# Patient Record
(demographics unavailable — no encounter records)

---

## 2025-01-06 NOTE — ASSESSMENT
[FreeTextEntry1] : Previous doc: she has adv OA in PF and mild in the med compartment with some spurring - we will start with PT she is loosing wt and did discuss HA inj in the future - some mild Hand OA 8/7/18: Less pain since starting PT - will continue this for now. 8/27/18: Acute worsening right knee pain - asp/inj today and reeval in 1 month. Will get auth for orthovisc. 10/16/18: Inj tolerated well - asp 30cc. 10/23/18: Inj tolerated well. Asp 30cc. 10/30/18: Inj tolerated well - asp 30cc. 11/6/18: Inj tolerated well - asp 30cc. 12/4/18: No sig relief from HA or cortisone - MRI right knee eval for MMT. Has OA but mostly PF compartment and her pain is medial. 1/8/19: MRI showing adv medial and PF OA. Too much damage for arthroscopy. Discussed right TKA when she is ready. - she is planning for TKA in June when the kids are off as she takes care of her grandkids 12/5/22: Advanced OA b/l knees. She is not a arthroscope candidate at this time due to severe OA. Due to acute inflammation on the left knee, recommend CSI/ASP and with chronic OA on the right knee, will start HA injections. Discussed need for TKA in the future but at this point, she would like to hold off. If left knee continues to have pain, I recommend starting HA injections next week as she has had good results in the past. Start PT for early ROM of left knee. 12/12/22: Inj tolerated well. 12/19/22: Inj tolerated well. 1/9/23: Inj tolerated well.  Cont left knee pain, start orthovisc left knee today as well. 3/6/23: Had 1st inj 2 months ago but unable to return since due to death in the family - resume series, inj tolerated well.  Right knee is better after orthovisc. 3/20/23: Inj tolerated well. 4/24/23: Inj tolerated well. 5/6/24: Adv OA b/l knees (R>L). XR showing some progression since 2022. Still has decent motion and function- will hold off on surgery and continue to proceed conservatively. She is well informed and would like to proceed with b/l knee csi and a course of PT. Follow up 6 weeks.   1/6/25: Worsening right knee pain recently, will try cortisone inj again - tolerated well.  Had good relief last time from this.  Left knee tolerable for now, will hold off on inj.

## 2025-01-06 NOTE — PROCEDURE
[FreeTextEntry3] : Large joint injection was performed on the right knee. The indication for this procedure was pain, inflammation, and x-ray evidence of Osteoarthritis on this or prior visit. The site was prepped with betadine, ethyl chloride sprayed topically, and sterile technique used. An injection of Lidocaine 3cc of 1%, Bupivacaine (Marcaine) 5cc of 0.25%, Methylprednisolone (Depomedrol) cc of 80 mg was used. Patient was advised to call if redness, pain, or fever occur, apply ice for 15 minutes out of every hour for the next 12-24 hours as tolerated and patient was advised to rest the joint(s) for days. Patient has tried OTC's including aspirin, Ibuprofen, Aleve, etc. or prescription NSAIDS, and/or exercises at home and/or physical therapy without satisfactory response and patient had decreased mobility in the joint. Ultrasound guidance was indicated for this patient due to better visualize joint space. All ultrasound images have been permanently captured and stored accordingly in our picture.   Aspiration of the knee was performed using an 18-gauge needle under sterile conditions. ------------cc of ---------------fluid was aspirated (this was sent for cell count, culture, gram stain, and crystals)

## 2025-01-06 NOTE — IMAGING
[de-identified] : left knee: no pain with valgus stress 5-100 medial joint line tenderness crepitus 5/5 NVI  right knee: 5-100 medial joint line tenderness  crepitus no sig effusion +2 pt pulses 5/5 NVI

## 2025-01-06 NOTE — HISTORY OF PRESENT ILLNESS
[5] : 5 [2] : 2 [Dull/Aching] : dull/aching [Radiating] : radiating [Rest] : rest [Ice] : ice [Injection therapy] : injection therapy [Stairs] : stairs [de-identified] : 1/6/25: Worsening knee pain recently.  Did well with cortisone last time.  Previous doc: 4/24/18: Right > left knee pain and swelling for several years. Aleve and ice helps. Pain is not everyday. Also left hand pain occasionally - right hand dominant. Family history of RA. 8/7/18: Feeling better with PT. 8/27/18: Worsening pain after last visit, difficulty walking. 10/16/18: Orthovisc #1 right knee. 10/23/18: Orthovisc #2 right knee. 10/30/18: Orthovisc #3 right knee. Improving. 11/6/18: Orthovisc #4 right knee. 12/4/18: Mild relief from HA injections but still has pain. 1/8/19: F/U MRI right knee. Continued pain - had a few days where it was better but overall still trouble sleeping and pain with activity.  3/5 she is overall doing better and has better ROM wth PT  Pain at night some nights not pain min pain during the day finding lately more days with less pain  12/5/22: Patient was moving boxes 11/22/22 and turned and felt an acute pop in her left knee that is significantly painful. She has been wearing a BLANCO since then. Pain has started to subside slowly. Also having increasing pain in the right knee with hx of OA. She was contemplating TKA in the past but with HA injections and PT she has gotten better.  12/12/22: Orthovisc #2 right knee. 12/19/22: Orthovisc #3 right knee. 1/9/23: Orthovisc #4 right knee, improving.  Concerned about left knee. 3/6/23: Mild improvement with right knee pain s/p completing series of orthovisc injections. Started orthovisc for the left knee at last visit, has not been able to come back due to a death in the family. Reports continued pain and stiffness over both knees.  3/20/23: Orthovisc #3 left knee. 4/24/23: Orthovisc #4 left knee.  Has not been able to f/u sooner due to travel for family. 5/6/24: Adv OA b/l knees- here today for the RT knee. Interested in beginning PT. Difficulty with extension. Taking aleeve as needed with good relief. Ambulates without assistance. [] : no [FreeTextEntry1] : RT knee  [FreeTextEntry5] : Follow up on the RT knee. Patient states she has fluid in it again and is interested in getting it aspirated. Patient states Pain has now recently started to turn into a leg cramp, she notices it is when she sits too long. It has been awhile since PT, but she is not opposed to starting again. Patient states great relief with GEL injections in past [FreeTextEntry6] : Swelling  [FreeTextEntry7] : cramp through leg [de-identified] : prolonged activity

## 2025-01-06 NOTE — DISCUSSION/SUMMARY
[de-identified] : The natural progression of Osteoarthritis was explained to the patient.  We discussed the possible treatment options from conservative to operative.  These included NSAIDS, Glucosamine and Chondrotin sulfate, and Physical Therapy as well different types of injections.  We also discussed that at some point they may progress to needed a TKA.  Information and pamphlets were given when appropriate.

## 2025-03-03 NOTE — HISTORY OF PRESENT ILLNESS
[2] : 2 [Dull/Aching] : dull/aching [Radiating] : radiating [Rest] : rest [Ice] : ice [Injection therapy] : injection therapy [Stairs] : stairs [4] : 4 [Shooting] : shooting [Intermittent] : intermittent [Meds] : meds [de-identified] : 3/3/25: Pt with adv b/l knee OA (R>L). Sig relief with csi inj in the RT knee in January for 3 weeks but now pain has returned and worsening. Feels knee locking up. LT knee has been stable. Ambulates without assistance.   Previous doc: 4/24/18: Right > left knee pain and swelling for several years. Aleve and ice helps. Pain is not everyday. Also left hand pain occasionally - right hand dominant. Family history of RA. 8/7/18: Feeling better with PT. 8/27/18: Worsening pain after last visit, difficulty walking. 10/16/18: Orthovisc #1 right knee. 10/23/18: Orthovisc #2 right knee. 10/30/18: Orthovisc #3 right knee. Improving. 11/6/18: Orthovisc #4 right knee. 12/4/18: Mild relief from HA injections but still has pain. 1/8/19: F/U MRI right knee. Continued pain - had a few days where it was better but overall still trouble sleeping and pain with activity.  3/5 she is overall doing better and has better ROM wth PT  Pain at night some nights not pain min pain during the day finding lately more days with less pain  12/5/22: Patient was moving boxes 11/22/22 and turned and felt an acute pop in her left knee that is significantly painful. She has been wearing a BLANCO since then. Pain has started to subside slowly. Also having increasing pain in the right knee with hx of OA. She was contemplating TKA in the past but with HA injections and PT she has gotten better.  12/12/22: Orthovisc #2 right knee. 12/19/22: Orthovisc #3 right knee. 1/9/23: Orthovisc #4 right knee, improving.  Concerned about left knee. 3/6/23: Mild improvement with right knee pain s/p completing series of orthovisc injections. Started orthovisc for the left knee at last visit, has not been able to come back due to a death in the family. Reports continued pain and stiffness over both knees.  3/20/23: Orthovisc #3 left knee. 4/24/23: Orthovisc #4 left knee.  Has not been able to f/u sooner due to travel for family. 5/6/24: Adv OA b/l knees- here today for the RT knee. Interested in beginning PT. Difficulty with extension. Taking aleeve as needed with good relief. Ambulates without assistance. 1/6/25: Worsening knee pain recently.  Did well with cortisone last time. [] : no [FreeTextEntry1] : RT knee  [FreeTextEntry5] : CSI felt amazing but only lasted 3 weeks [FreeTextEntry6] : Locking and Swelling [FreeTextEntry7] : cramp through leg [de-identified] : prolonged activity, lifting leg, getting up

## 2025-03-03 NOTE — IMAGING
[de-identified] : RIGHT KNEE ROM 5-115, some guarding medial joint line tenderness  lateral joint line tenderness crepitus no sig effusion +2 pt pulses 5/5 no edema NVI  LEFT KNEE no pain with valgus stress 5-100 medial joint line tenderness crepitus 5/5 NVI  XR B/L KNEES (3/3/25) showing adv varus OA, sig PF OA on the left- KL 4, no sig progression from May 2024

## 2025-03-03 NOTE — ASSESSMENT
[FreeTextEntry1] : Previous doc: she has adv OA in PF and mild in the med compartment with some spurring - we will start with PT she is loosing wt and did discuss HA inj in the future - some mild Hand OA 8/7/18: Less pain since starting PT - will continue this for now. 8/27/18: Acute worsening right knee pain - asp/inj today and reeval in 1 month. Will get auth for orthovisc. 10/16/18: Inj tolerated well - asp 30cc. 10/23/18: Inj tolerated well. Asp 30cc. 10/30/18: Inj tolerated well - asp 30cc. 11/6/18: Inj tolerated well - asp 30cc. 12/4/18: No sig relief from HA or cortisone - MRI right knee eval for MMT. Has OA but mostly PF compartment and her pain is medial. 1/8/19: MRI showing adv medial and PF OA. Too much damage for arthroscopy. Discussed right TKA when she is ready. - she is planning for TKA in June when the kids are off as she takes care of her grandkids 12/5/22: Advanced OA b/l knees. She is not a arthroscope candidate at this time due to severe OA. Due to acute inflammation on the left knee, recommend CSI/ASP and with chronic OA on the right knee, will start HA injections. Discussed need for TKA in the future but at this point, she would like to hold off. If left knee continues to have pain, I recommend starting HA injections next week as she has had good results in the past. Start PT for early ROM of left knee. 12/12/22: Inj tolerated well. 12/19/22: Inj tolerated well. 1/9/23: Inj tolerated well.  Cont left knee pain, start orthovisc left knee today as well. 3/6/23: Had 1st inj 2 months ago but unable to return since due to death in the family - resume series, inj tolerated well.  Right knee is better after orthovisc. 3/20/23: Inj tolerated well. 4/24/23: Inj tolerated well. 5/6/24: Adv OA b/l knees (R>L). XR showing some progression since 2022. Still has decent motion and function- will hold off on surgery and continue to proceed conservatively. She is well informed and would like to proceed with b/l knee csi and a course of PT. Follow up 6 weeks.  1/6/25: Worsening right knee pain recently, will try cortisone inj again - tolerated well.  Had good relief last time from this.  Left knee tolerable for now, will hold off on inj.  3/3/25: Pt with adv b/l knee OA (R>L symptomatically). Sig relief with RT knee csi at last visit but only for about 3 weeks. XR show no sig progression but RT knee symptoms have gotten significantly worse. Discussed options- will try HA series as this has provided relief for her in the past. Orthovisc #1 RT knee done today- flaquita well. F/up 1 week to cont series electronic

## 2025-03-03 NOTE — PROCEDURE
[Right] : of the right [Pain] : pain [Inflammation] : inflammation [X-ray evidence of Osteoarthritis on this or prior visit] : x-ray evidence of Osteoarthritis on this or prior visit [Repeat series performed] : repeat series performed [Alcohol] : alcohol [Betadine] : betadine [Ethyl Chloride sprayed topically] : ethyl chloride sprayed topically [Sterile technique used] : sterile technique used [___ cc    1%] : Lidocaine ~Vcc of 1%  [Patient was advised to rest the joint(s) for ____ days] : patient was advised to rest the joint(s) for [unfilled] days [All ultrasound images have been permanently captured and stored accordingly in our picture archiving and communication system] : All ultrasound images have been permanently captured and stored accordingly in our picture archiving and communication system [Visualization of the needle and placement of injection was performed without complication] : visualization of the needle and placement of injection was performed without complication [Large Joint Injection] : Large joint injection [Left] : of the left [Knee] : knee [Orthovisc (30mg)] : 30mg of Orthovisc [#1] : series #1 [] : Patient tolerated procedure well [Call if redness, pain or fever occur] : call if redness, pain or fever occur [Apply ice for 15min out of every hour for the next 12-24 hours as tolerated] : apply ice for 15 minutes out of every hour for the next 12-24 hours as tolerated [Previous OTC use and PT nontherapeutic] : patient has tried OTC's including aspirin, Ibuprofen, Aleve, etc or prescription NSAIDS, and/or exercises at home and/or physical therapy without satisfactory response [Patient had decreased mobility in the joint] : patient had decreased mobility in the joint [Risks, benefits, alternatives discussed / Verbal consent obtained] : the risks benefits, and alternatives have been discussed, and verbal consent was obtained

## 2025-03-03 NOTE — DISCUSSION/SUMMARY
[de-identified] : The natural progression of Osteoarthritis was explained to the patient.  We discussed the possible treatment options from conservative to operative.  These included NSAIDS, Glucosamine and Chondrotin sulfate, and Physical Therapy as well different types of injections.  We also discussed that at some point they may progress to needed a TKA.  Information and pamphlets were given when appropriate.  The risks, benefits, contents and alternatives to injection were explained in full to the patient.  Risks outlined include but are not limited to infection, sepsis, bleeding, scarring, skin discoloration, temporary increase in pain, syncopal episode, failure to resolve symptoms, allergic reaction, flare reaction, permanent white skin discoloration, symptom recurrence, and elevation of blood sugar in diabetics.  Patient understood the risks.  All questions were answered.  After discussion of options, patient requested an injection.  Oral informed consent was obtained and sterile prep was done of the injection site.  Sterile technique was used to introduce the mixture.  Patient tolerated the procedure well.  Patient advised to ice the injection site this evening.  Signs and symptoms of infection reviewed and patient advised to call immediately for redness, fevers, and/or chills.  Entered by Ritu Hylton acting as a scribe.

## 2025-03-18 NOTE — DISCUSSION/SUMMARY
[de-identified] : The natural progression of Osteoarthritis was explained to the patient.  We discussed the possible treatment options from conservative to operative.  These included NSAIDS, Glucosamine and Chondrotin sulfate, and Physical Therapy as well different types of injections.  We also discussed that at some point they may progress to needed a TKA.  Information and pamphlets were given when appropriate.  The risks, benefits, contents and alternatives to injection were explained in full to the patient.  Risks outlined include but are not limited to infection, sepsis, bleeding, scarring, skin discoloration, temporary increase in pain, syncopal episode, failure to resolve symptoms, allergic reaction, flare reaction, permanent white skin discoloration, symptom recurrence, and elevation of blood sugar in diabetics.  Patient understood the risks.  All questions were answered.  After discussion of options, patient requested an injection.  Oral informed consent was obtained and sterile prep was done of the injection site.  Sterile technique was used to introduce the mixture.  Patient tolerated the procedure well.  Patient advised to ice the injection site this evening.  Signs and symptoms of infection reviewed and patient advised to call immediately for redness, fevers, and/or chills.  Progress Note completed by Josie Velasco PA-C *Dr. Richmond- The DANNY assigned on this date is under my supervision and saw this patient independently on this visit. I was in the office suite at the time.  I have periodically reviewed the patient chart as needed and I continue to oversee the medical decision making and care.

## 2025-03-18 NOTE — PROCEDURE
[Right] : of the right [Pain] : pain [Inflammation] : inflammation [X-ray evidence of Osteoarthritis on this or prior visit] : x-ray evidence of Osteoarthritis on this or prior visit [Repeat series performed] : repeat series performed [Alcohol] : alcohol [Betadine] : betadine [Ethyl Chloride sprayed topically] : ethyl chloride sprayed topically [Sterile technique used] : sterile technique used [___ cc    1%] : Lidocaine ~Vcc of 1%  [Patient was advised to rest the joint(s) for ____ days] : patient was advised to rest the joint(s) for [unfilled] days [All ultrasound images have been permanently captured and stored accordingly in our picture archiving and communication system] : All ultrasound images have been permanently captured and stored accordingly in our picture archiving and communication system [Visualization of the needle and placement of injection was performed without complication] : visualization of the needle and placement of injection was performed without complication [Large Joint Injection] : Large joint injection [Left] : of the left [Knee] : knee [Orthovisc (30mg)] : 30mg of Orthovisc [#1] : series #1 [] : Patient tolerated procedure well [Call if redness, pain or fever occur] : call if redness, pain or fever occur [Apply ice for 15min out of every hour for the next 12-24 hours as tolerated] : apply ice for 15 minutes out of every hour for the next 12-24 hours as tolerated [Previous OTC use and PT nontherapeutic] : patient has tried OTC's including aspirin, Ibuprofen, Aleve, etc or prescription NSAIDS, and/or exercises at home and/or physical therapy without satisfactory response [Patient had decreased mobility in the joint] : patient had decreased mobility in the joint [Risks, benefits, alternatives discussed / Verbal consent obtained] : the risks benefits, and alternatives have been discussed, and verbal consent was obtained [#2] : series #2

## 2025-03-18 NOTE — IMAGING
[de-identified] : RIGHT KNEE ROM 5-115, some guarding medial joint line tenderness  lateral joint line tenderness crepitus no sig effusion +2 pt pulses 5/5 no edema NVI  LEFT KNEE no pain with valgus stress 5-100 medial joint line tenderness crepitus 5/5 NVI  XR B/L KNEES (3/3/25) showing adv varus OA, sig PF OA on the left- KL 4, no sig progression from May 2024

## 2025-03-18 NOTE — IMAGING
[de-identified] : RIGHT KNEE ROM 5-115, some guarding medial joint line tenderness  lateral joint line tenderness crepitus no sig effusion +2 pt pulses 5/5 no edema NVI  LEFT KNEE no pain with valgus stress 5-100 medial joint line tenderness crepitus 5/5 NVI  XR B/L KNEES (3/3/25) showing adv varus OA, sig PF OA on the left- KL 4, no sig progression from May 2024

## 2025-03-18 NOTE — HISTORY OF PRESENT ILLNESS
[de-identified] : 3/18/25: Orthovisc #2 right knee.  Previous doc: 4/24/18: Right > left knee pain and swelling for several years. Aleve and ice helps. Pain is not everyday. Also left hand pain occasionally - right hand dominant. Family history of RA. 8/7/18: Feeling better with PT. 8/27/18: Worsening pain after last visit, difficulty walking. 10/16/18: Orthovisc #1 right knee. 10/23/18: Orthovisc #2 right knee. 10/30/18: Orthovisc #3 right knee. Improving. 11/6/18: Orthovisc #4 right knee. 12/4/18: Mild relief from HA injections but still has pain. 1/8/19: F/U MRI right knee. Continued pain - had a few days where it was better but overall still trouble sleeping and pain with activity.  3/5 she is overall doing better and has better ROM wth PT  Pain at night some nights not pain min pain during the day finding lately more days with less pain  12/5/22: Patient was moving boxes 11/22/22 and turned and felt an acute pop in her left knee that is significantly painful. She has been wearing a BLANCO since then. Pain has started to subside slowly. Also having increasing pain in the right knee with hx of OA. She was contemplating TKA in the past but with HA injections and PT she has gotten better.  12/12/22: Orthovisc #2 right knee. 12/19/22: Orthovisc #3 right knee. 1/9/23: Orthovisc #4 right knee, improving.  Concerned about left knee. 3/6/23: Mild improvement with right knee pain s/p completing series of orthovisc injections. Started orthovisc for the left knee at last visit, has not been able to come back due to a death in the family. Reports continued pain and stiffness over both knees.  3/20/23: Orthovisc #3 left knee. 4/24/23: Orthovisc #4 left knee.  Has not been able to f/u sooner due to travel for family. 5/6/24: Adv OA b/l knees- here today for the RT knee. Interested in beginning PT. Difficulty with extension. Taking aleeve as needed with good relief. Ambulates without assistance. 1/6/25: Worsening knee pain recently.  Did well with cortisone last time. 3/3/25: Pt with adv b/l knee OA (R>L). Sig relief with csi inj in the RT knee in January for 3 weeks but now pain has returned and worsening. Feels knee locking up. LT knee has been stable. Ambulates without assistance.

## 2025-03-18 NOTE — HISTORY OF PRESENT ILLNESS
[de-identified] : 3/18/25: Orthovisc #2 right knee.  Previous doc: 4/24/18: Right > left knee pain and swelling for several years. Aleve and ice helps. Pain is not everyday. Also left hand pain occasionally - right hand dominant. Family history of RA. 8/7/18: Feeling better with PT. 8/27/18: Worsening pain after last visit, difficulty walking. 10/16/18: Orthovisc #1 right knee. 10/23/18: Orthovisc #2 right knee. 10/30/18: Orthovisc #3 right knee. Improving. 11/6/18: Orthovisc #4 right knee. 12/4/18: Mild relief from HA injections but still has pain. 1/8/19: F/U MRI right knee. Continued pain - had a few days where it was better but overall still trouble sleeping and pain with activity.  3/5 she is overall doing better and has better ROM wth PT  Pain at night some nights not pain min pain during the day finding lately more days with less pain  12/5/22: Patient was moving boxes 11/22/22 and turned and felt an acute pop in her left knee that is significantly painful. She has been wearing a BLANCO since then. Pain has started to subside slowly. Also having increasing pain in the right knee with hx of OA. She was contemplating TKA in the past but with HA injections and PT she has gotten better.  12/12/22: Orthovisc #2 right knee. 12/19/22: Orthovisc #3 right knee. 1/9/23: Orthovisc #4 right knee, improving.  Concerned about left knee. 3/6/23: Mild improvement with right knee pain s/p completing series of orthovisc injections. Started orthovisc for the left knee at last visit, has not been able to come back due to a death in the family. Reports continued pain and stiffness over both knees.  3/20/23: Orthovisc #3 left knee. 4/24/23: Orthovisc #4 left knee.  Has not been able to f/u sooner due to travel for family. 5/6/24: Adv OA b/l knees- here today for the RT knee. Interested in beginning PT. Difficulty with extension. Taking aleeve as needed with good relief. Ambulates without assistance. 1/6/25: Worsening knee pain recently.  Did well with cortisone last time. 3/3/25: Pt with adv b/l knee OA (R>L). Sig relief with csi inj in the RT knee in January for 3 weeks but now pain has returned and worsening. Feels knee locking up. LT knee has been stable. Ambulates without assistance.

## 2025-03-18 NOTE — ASSESSMENT
[FreeTextEntry1] : Previous doc: she has adv OA in PF and mild in the med compartment with some spurring - we will start with PT she is loosing wt and did discuss HA inj in the future - some mild Hand OA 8/7/18: Less pain since starting PT - will continue this for now. 8/27/18: Acute worsening right knee pain - asp/inj today and reeval in 1 month. Will get auth for orthovisc. 10/16/18: Inj tolerated well - asp 30cc. 10/23/18: Inj tolerated well. Asp 30cc. 10/30/18: Inj tolerated well - asp 30cc. 11/6/18: Inj tolerated well - asp 30cc. 12/4/18: No sig relief from HA or cortisone - MRI right knee eval for MMT. Has OA but mostly PF compartment and her pain is medial. 1/8/19: MRI showing adv medial and PF OA. Too much damage for arthroscopy. Discussed right TKA when she is ready. - she is planning for TKA in June when the kids are off as she takes care of her grandkids 12/5/22: Advanced OA b/l knees. She is not a arthroscope candidate at this time due to severe OA. Due to acute inflammation on the left knee, recommend CSI/ASP and with chronic OA on the right knee, will start HA injections. Discussed need for TKA in the future but at this point, she would like to hold off. If left knee continues to have pain, I recommend starting HA injections next week as she has had good results in the past. Start PT for early ROM of left knee. 12/12/22: Inj tolerated well. 12/19/22: Inj tolerated well. 1/9/23: Inj tolerated well.  Cont left knee pain, start orthovisc left knee today as well. 3/6/23: Had 1st inj 2 months ago but unable to return since due to death in the family - resume series, inj tolerated well.  Right knee is better after orthovisc. 3/20/23: Inj tolerated well. 4/24/23: Inj tolerated well. 5/6/24: Adv OA b/l knees (R>L). XR showing some progression since 2022. Still has decent motion and function- will hold off on surgery and continue to proceed conservatively. She is well informed and would like to proceed with b/l knee csi and a course of PT. Follow up 6 weeks.  1/6/25: Worsening right knee pain recently, will try cortisone inj again - tolerated well.  Had good relief last time from this.  Left knee tolerable for now, will hold off on inj. 3/3/25: Pt with adv b/l knee OA (R>L symptomatically). Sig relief with RT knee csi at last visit but only for about 3 weeks. XR show no sig progression but RT knee symptoms have gotten significantly worse. Discussed options- will try HA series as this has provided relief for her in the past. Orthovisc #1 RT knee done today- flaquita well. F/up 1 week to cont series  3/18/25:  Orthovisc #2 RT knee done today- flaquita well. F/up 1 week to cont series

## 2025-03-18 NOTE — DISCUSSION/SUMMARY
[de-identified] : The natural progression of Osteoarthritis was explained to the patient.  We discussed the possible treatment options from conservative to operative.  These included NSAIDS, Glucosamine and Chondrotin sulfate, and Physical Therapy as well different types of injections.  We also discussed that at some point they may progress to needed a TKA.  Information and pamphlets were given when appropriate.  The risks, benefits, contents and alternatives to injection were explained in full to the patient.  Risks outlined include but are not limited to infection, sepsis, bleeding, scarring, skin discoloration, temporary increase in pain, syncopal episode, failure to resolve symptoms, allergic reaction, flare reaction, permanent white skin discoloration, symptom recurrence, and elevation of blood sugar in diabetics.  Patient understood the risks.  All questions were answered.  After discussion of options, patient requested an injection.  Oral informed consent was obtained and sterile prep was done of the injection site.  Sterile technique was used to introduce the mixture.  Patient tolerated the procedure well.  Patient advised to ice the injection site this evening.  Signs and symptoms of infection reviewed and patient advised to call immediately for redness, fevers, and/or chills.  Progress Note completed by Josie Velasco PA-C *Dr. Richmond- The DANNY assigned on this date is under my supervision and saw this patient independently on this visit. I was in the office suite at the time.  I have periodically reviewed the patient chart as needed and I continue to oversee the medical decision making and care.

## 2025-03-24 NOTE — PROCEDURE
[Right] : of the right [Pain] : pain [Inflammation] : inflammation [X-ray evidence of Osteoarthritis on this or prior visit] : x-ray evidence of Osteoarthritis on this or prior visit [Repeat series performed] : repeat series performed [Alcohol] : alcohol [Betadine] : betadine [Ethyl Chloride sprayed topically] : ethyl chloride sprayed topically [Sterile technique used] : sterile technique used [___ cc    1%] : Lidocaine ~Vcc of 1%  [Patient was advised to rest the joint(s) for ____ days] : patient was advised to rest the joint(s) for [unfilled] days [All ultrasound images have been permanently captured and stored accordingly in our picture archiving and communication system] : All ultrasound images have been permanently captured and stored accordingly in our picture archiving and communication system [Visualization of the needle and placement of injection was performed without complication] : visualization of the needle and placement of injection was performed without complication [Large Joint Injection] : Large joint injection [Left] : of the left [Knee] : knee [Orthovisc (30mg)] : 30mg of Orthovisc [#1] : series #1 [] : Patient tolerated procedure well [Call if redness, pain or fever occur] : call if redness, pain or fever occur [Apply ice for 15min out of every hour for the next 12-24 hours as tolerated] : apply ice for 15 minutes out of every hour for the next 12-24 hours as tolerated [Previous OTC use and PT nontherapeutic] : patient has tried OTC's including aspirin, Ibuprofen, Aleve, etc or prescription NSAIDS, and/or exercises at home and/or physical therapy without satisfactory response [Patient had decreased mobility in the joint] : patient had decreased mobility in the joint [Risks, benefits, alternatives discussed / Verbal consent obtained] : the risks benefits, and alternatives have been discussed, and verbal consent was obtained [#3] : series #3

## 2025-03-28 NOTE — HISTORY OF PRESENT ILLNESS
[de-identified] : 3/24/25: Orthovisc #3 right knee.   Previous doc: 4/24/18: Right > left knee pain and swelling for several years. Aleve and ice helps. Pain is not everyday. Also left hand pain occasionally - right hand dominant. Family history of RA. 8/7/18: Feeling better with PT. 8/27/18: Worsening pain after last visit, difficulty walking. 10/16/18: Orthovisc #1 right knee. 10/23/18: Orthovisc #2 right knee. 10/30/18: Orthovisc #3 right knee. Improving. 11/6/18: Orthovisc #4 right knee. 12/4/18: Mild relief from HA injections but still has pain. 1/8/19: F/U MRI right knee. Continued pain - had a few days where it was better but overall still trouble sleeping and pain with activity. 3/5 she is overall doing better and has better ROM wth PT  Pain at night some nights not pain min pain during the day finding lately more days with less pain  12/5/22: Patient was moving boxes 11/22/22 and turned and felt an acute pop in her left knee that is significantly painful. She has been wearing a BLANCO since then. Pain has started to subside slowly. Also having increasing pain in the right knee with hx of OA. She was contemplating TKA in the past but with HA injections and PT she has gotten better.  12/12/22: Orthovisc #2 right knee. 12/19/22: Orthovisc #3 right knee. 1/9/23: Orthovisc #4 right knee, improving.  Concerned about left knee. 3/6/23: Mild improvement with right knee pain s/p completing series of orthovisc injections. Started orthovisc for the left knee at last visit, has not been able to come back due to a death in the family. Reports continued pain and stiffness over both knees.  3/20/23: Orthovisc #3 left knee. 4/24/23: Orthovisc #4 left knee.  Has not been able to f/u sooner due to travel for family. 5/6/24: Adv OA b/l knees- here today for the RT knee. Interested in beginning PT. Difficulty with extension. Taking aleeve as needed with good relief. Ambulates without assistance. 1/6/25: Worsening knee pain recently.  Did well with cortisone last time. 3/3/25: Pt with adv b/l knee OA (R>L). Sig relief with csi inj in the RT knee in January for 3 weeks but now pain has returned and worsening. Feels knee locking up. LT knee has been stable. Ambulates without assistance.  3/18/25: Orthovisc #2 right knee.

## 2025-03-28 NOTE — IMAGING
[de-identified] : RIGHT KNEE ROM 5-115, some guarding medial joint line tenderness  lateral joint line tenderness crepitus no sig effusion +2 pt pulses 5/5 no edema NVI  LEFT KNEE no pain with valgus stress 5-100 medial joint line tenderness crepitus 5/5 NVI  XR B/L KNEES (3/3/25) showing adv varus OA, sig PF OA on the left- KL 4, no sig progression from May 2024

## 2025-03-28 NOTE — DISCUSSION/SUMMARY
[de-identified] : The natural progression of Osteoarthritis was explained to the patient.  We discussed the possible treatment options from conservative to operative.  These included NSAIDS, Glucosamine and Chondrotin sulfate, and Physical Therapy as well different types of injections.  We also discussed that at some point they may progress to needed a TKA.  Information and pamphlets were given when appropriate.  The risks, benefits, contents and alternatives to injection were explained in full to the patient.  Risks outlined include but are not limited to infection, sepsis, bleeding, scarring, skin discoloration, temporary increase in pain, syncopal episode, failure to resolve symptoms, allergic reaction, flare reaction, permanent white skin discoloration, symptom recurrence, and elevation of blood sugar in diabetics.  Patient understood the risks.  All questions were answered.  After discussion of options, patient requested an injection.  Oral informed consent was obtained and sterile prep was done of the injection site.  Sterile technique was used to introduce the mixture.  Patient tolerated the procedure well.  Patient advised to ice the injection site this evening.  Signs and symptoms of infection reviewed and patient advised to call immediately for redness, fevers, and/or chills.  Entered by Ritu Hylton acting as a scribe.

## 2025-03-28 NOTE — IMAGING
[de-identified] : RIGHT KNEE ROM 5-115, some guarding medial joint line tenderness  lateral joint line tenderness crepitus no sig effusion +2 pt pulses 5/5 no edema NVI  LEFT KNEE no pain with valgus stress 5-100 medial joint line tenderness crepitus 5/5 NVI  XR B/L KNEES (3/3/25) showing adv varus OA, sig PF OA on the left- KL 4, no sig progression from May 2024

## 2025-03-28 NOTE — ASSESSMENT
[FreeTextEntry1] : Previous doc: she has adv OA in PF and mild in the med compartment with some spurring - we will start with PT she is loosing wt and did discuss HA inj in the future - some mild Hand OA 8/7/18: Less pain since starting PT - will continue this for now. 8/27/18: Acute worsening right knee pain - asp/inj today and reeval in 1 month. Will get auth for orthovisc. 10/16/18: Inj tolerated well - asp 30cc. 10/23/18: Inj tolerated well. Asp 30cc. 10/30/18: Inj tolerated well - asp 30cc. 11/6/18: Inj tolerated well - asp 30cc. 12/4/18: No sig relief from HA or cortisone - MRI right knee eval for MMT. Has OA but mostly PF compartment and her pain is medial. 1/8/19: MRI showing adv medial and PF OA. Too much damage for arthroscopy. Discussed right TKA when she is ready. - she is planning for TKA in June when the kids are off as she takes care of her grandkids 12/5/22: Advanced OA b/l knees. She is not a arthroscope candidate at this time due to severe OA. Due to acute inflammation on the left knee, recommend CSI/ASP and with chronic OA on the right knee, will start HA injections. Discussed need for TKA in the future but at this point, she would like to hold off. If left knee continues to have pain, I recommend starting HA injections next week as she has had good results in the past. Start PT for early ROM of left knee. 12/12/22: Inj tolerated well. 12/19/22: Inj tolerated well. 1/9/23: Inj tolerated well.  Cont left knee pain, start orthovisc left knee today as well. 3/6/23: Had 1st inj 2 months ago but unable to return since due to death in the family - resume series, inj tolerated well.  Right knee is better after orthovisc. 3/20/23: Inj tolerated well. 4/24/23: Inj tolerated well. 5/6/24: Adv OA b/l knees (R>L). XR showing some progression since 2022. Still has decent motion and function- will hold off on surgery and continue to proceed conservatively. She is well informed and would like to proceed with b/l knee csi and a course of PT. Follow up 6 weeks.  1/6/25: Worsening right knee pain recently, will try cortisone inj again - tolerated well.  Had good relief last time from this.  Left knee tolerable for now, will hold off on inj. 3/3/25: Pt with adv b/l knee OA (R>L symptomatically). Sig relief with RT knee csi at last visit but only for about 3 weeks. XR show no sig progression but RT knee symptoms have gotten significantly worse. Discussed options- will try HA series as this has provided relief for her in the past. Orthovisc #1 RT knee done today- flaquita well. F/up 1 week to cont series 3/18/25:  Orthovisc #2 RT knee done today- flaquita well. F/up 1 week to cont series  3/24/25: Orthovisc #3 RT knee done today- flaquita well. F/up 1 week to cont series

## 2025-03-28 NOTE — HISTORY OF PRESENT ILLNESS
[de-identified] : 3/24/25: Orthovisc #3 right knee.   Previous doc: 4/24/18: Right > left knee pain and swelling for several years. Aleve and ice helps. Pain is not everyday. Also left hand pain occasionally - right hand dominant. Family history of RA. 8/7/18: Feeling better with PT. 8/27/18: Worsening pain after last visit, difficulty walking. 10/16/18: Orthovisc #1 right knee. 10/23/18: Orthovisc #2 right knee. 10/30/18: Orthovisc #3 right knee. Improving. 11/6/18: Orthovisc #4 right knee. 12/4/18: Mild relief from HA injections but still has pain. 1/8/19: F/U MRI right knee. Continued pain - had a few days where it was better but overall still trouble sleeping and pain with activity. 3/5 she is overall doing better and has better ROM wth PT  Pain at night some nights not pain min pain during the day finding lately more days with less pain  12/5/22: Patient was moving boxes 11/22/22 and turned and felt an acute pop in her left knee that is significantly painful. She has been wearing a BLANCO since then. Pain has started to subside slowly. Also having increasing pain in the right knee with hx of OA. She was contemplating TKA in the past but with HA injections and PT she has gotten better.  12/12/22: Orthovisc #2 right knee. 12/19/22: Orthovisc #3 right knee. 1/9/23: Orthovisc #4 right knee, improving.  Concerned about left knee. 3/6/23: Mild improvement with right knee pain s/p completing series of orthovisc injections. Started orthovisc for the left knee at last visit, has not been able to come back due to a death in the family. Reports continued pain and stiffness over both knees.  3/20/23: Orthovisc #3 left knee. 4/24/23: Orthovisc #4 left knee.  Has not been able to f/u sooner due to travel for family. 5/6/24: Adv OA b/l knees- here today for the RT knee. Interested in beginning PT. Difficulty with extension. Taking aleeve as needed with good relief. Ambulates without assistance. 1/6/25: Worsening knee pain recently.  Did well with cortisone last time. 3/3/25: Pt with adv b/l knee OA (R>L). Sig relief with csi inj in the RT knee in January for 3 weeks but now pain has returned and worsening. Feels knee locking up. LT knee has been stable. Ambulates without assistance.  3/18/25: Orthovisc #2 right knee.

## 2025-03-31 NOTE — PROCEDURE
[Large Joint Injection] : Large joint injection [Right] : of the right [Knee] : knee [Pain] : pain [Inflammation] : inflammation [X-ray evidence of Osteoarthritis on this or prior visit] : x-ray evidence of Osteoarthritis on this or prior visit [Repeat series performed] : repeat series performed [Alcohol] : alcohol [Betadine] : betadine [Ethyl Chloride sprayed topically] : ethyl chloride sprayed topically [Sterile technique used] : sterile technique used [___ cc    1%] : Lidocaine ~Vcc of 1%  [Orthovisc (30mg)] : 30mg of Orthovisc [] : Patient tolerated procedure well [Call if redness, pain or fever occur] : call if redness, pain or fever occur [Apply ice for 15min out of every hour for the next 12-24 hours as tolerated] : apply ice for 15 minutes out of every hour for the next 12-24 hours as tolerated [Patient was advised to rest the joint(s) for ____ days] : patient was advised to rest the joint(s) for [unfilled] days [Previous OTC use and PT nontherapeutic] : patient has tried OTC's including aspirin, Ibuprofen, Aleve, etc or prescription NSAIDS, and/or exercises at home and/or physical therapy without satisfactory response [Patient had decreased mobility in the joint] : patient had decreased mobility in the joint [Risks, benefits, alternatives discussed / Verbal consent obtained] : the risks benefits, and alternatives have been discussed, and verbal consent was obtained [All ultrasound images have been permanently captured and stored accordingly in our picture archiving and communication system] : All ultrasound images have been permanently captured and stored accordingly in our picture archiving and communication system [Visualization of the needle and placement of injection was performed without complication] : visualization of the needle and placement of injection was performed without complication [#4] : series #4

## 2025-03-31 NOTE — IMAGING
[de-identified] : RIGHT KNEE ROM 5-115, some guarding medial joint line tenderness  lateral joint line tenderness crepitus no sig effusion +2 pt pulses 5/5 no edema NVI  LEFT KNEE no pain with valgus stress 5-100 medial joint line tenderness crepitus 5/5 NVI  XR B/L KNEES (3/3/25) showing adv varus OA, sig PF OA on the left- KL 4, no sig progression from May 2024

## 2025-03-31 NOTE — HISTORY OF PRESENT ILLNESS
[4] : 4 [Orthovisc] : Orthovisc [de-identified] : 3/31/25: Orthovisc #4 right knee.  Thinking about surgery.  Previous doc: 4/24/18: Right > left knee pain and swelling for several years. Aleve and ice helps. Pain is not everyday. Also left hand pain occasionally - right hand dominant. Family history of RA. 8/7/18: Feeling better with PT. 8/27/18: Worsening pain after last visit, difficulty walking. 10/16/18: Orthovisc #1 right knee. 10/23/18: Orthovisc #2 right knee. 10/30/18: Orthovisc #3 right knee. Improving. 11/6/18: Orthovisc #4 right knee. 12/4/18: Mild relief from HA injections but still has pain. 1/8/19: F/U MRI right knee. Continued pain - had a few days where it was better but overall still trouble sleeping and pain with activity. 3/5 she is overall doing better and has better ROM wth PT  Pain at night some nights not pain min pain during the day finding lately more days with less pain  12/5/22: Patient was moving boxes 11/22/22 and turned and felt an acute pop in her left knee that is significantly painful. She has been wearing a BLANCO since then. Pain has started to subside slowly. Also having increasing pain in the right knee with hx of OA. She was contemplating TKA in the past but with HA injections and PT she has gotten better.  12/12/22: Orthovisc #2 right knee. 12/19/22: Orthovisc #3 right knee. 1/9/23: Orthovisc #4 right knee, improving.  Concerned about left knee. 3/6/23: Mild improvement with right knee pain s/p completing series of orthovisc injections. Started orthovisc for the left knee at last visit, has not been able to come back due to a death in the family. Reports continued pain and stiffness over both knees.  3/20/23: Orthovisc #3 left knee. 4/24/23: Orthovisc #4 left knee.  Has not been able to f/u sooner due to travel for family. 5/6/24: Adv OA b/l knees- here today for the RT knee. Interested in beginning PT. Difficulty with extension. Taking aleeve as needed with good relief. Ambulates without assistance. 1/6/25: Worsening knee pain recently.  Did well with cortisone last time. 3/3/25: Pt with adv b/l knee OA (R>L). Sig relief with csi inj in the RT knee in January for 3 weeks but now pain has returned and worsening. Feels knee locking up. LT knee has been stable. Ambulates without assistance.  3/18/25: Orthovisc #2 right knee. 3/24/25: Orthovisc #3 right knee.

## 2025-03-31 NOTE — DISCUSSION/SUMMARY
[de-identified] : The natural progression of Osteoarthritis was explained to the patient.  We discussed the possible treatment options from conservative to operative.  These included NSAIDS, Glucosamine and Chondrotin sulfate, and Physical Therapy as well different types of injections.  We also discussed that at some point they may progress to needed a TKA.  Information and pamphlets were given when appropriate.  The risks, benefits, contents and alternatives to injection were explained in full to the patient.  Risks outlined include but are not limited to infection, sepsis, bleeding, scarring, skin discoloration, temporary increase in pain, syncopal episode, failure to resolve symptoms, allergic reaction, flare reaction, permanent white skin discoloration, symptom recurrence, and elevation of blood sugar in diabetics.  Patient understood the risks.  All questions were answered.  After discussion of options, patient requested an injection.  Oral informed consent was obtained and sterile prep was done of the injection site.  Sterile technique was used to introduce the mixture.  Patient tolerated the procedure well.  Patient advised to ice the injection site this evening.  Signs and symptoms of infection reviewed and patient advised to call immediately for redness, fevers, and/or chills.  Entered by Ritu Hylton acting as a scribe.

## 2025-03-31 NOTE — ASSESSMENT
[FreeTextEntry1] : Previous doc: she has adv OA in PF and mild in the med compartment with some spurring - we will start with PT she is loosing wt and did discuss HA inj in the future - some mild Hand OA 8/7/18: Less pain since starting PT - will continue this for now. 8/27/18: Acute worsening right knee pain - asp/inj today and reeval in 1 month. Will get auth for orthovisc. 10/16/18: Inj tolerated well - asp 30cc. 10/23/18: Inj tolerated well. Asp 30cc. 10/30/18: Inj tolerated well - asp 30cc. 11/6/18: Inj tolerated well - asp 30cc. 12/4/18: No sig relief from HA or cortisone - MRI right knee eval for MMT. Has OA but mostly PF compartment and her pain is medial. 1/8/19: MRI showing adv medial and PF OA. Too much damage for arthroscopy. Discussed right TKA when she is ready. - she is planning for TKA in June when the kids are off as she takes care of her grandkids 12/5/22: Advanced OA b/l knees. She is not a arthroscope candidate at this time due to severe OA. Due to acute inflammation on the left knee, recommend CSI/ASP and with chronic OA on the right knee, will start HA injections. Discussed need for TKA in the future but at this point, she would like to hold off. If left knee continues to have pain, I recommend starting HA injections next week as she has had good results in the past. Start PT for early ROM of left knee. 12/12/22: Inj tolerated well. 12/19/22: Inj tolerated well. 1/9/23: Inj tolerated well.  Cont left knee pain, start orthovisc left knee today as well. 3/6/23: Had 1st inj 2 months ago but unable to return since due to death in the family - resume series, inj tolerated well.  Right knee is better after orthovisc. 3/20/23: Inj tolerated well. 4/24/23: Inj tolerated well. 5/6/24: Adv OA b/l knees (R>L). XR showing some progression since 2022. Still has decent motion and function- will hold off on surgery and continue to proceed conservatively. She is well informed and would like to proceed with b/l knee csi and a course of PT. Follow up 6 weeks.  1/6/25: Worsening right knee pain recently, will try cortisone inj again - tolerated well.  Had good relief last time from this.  Left knee tolerable for now, will hold off on inj. 3/3/25: Pt with adv b/l knee OA (R>L symptomatically). Sig relief with RT knee csi at last visit but only for about 3 weeks. XR show no sig progression but RT knee symptoms have gotten significantly worse. Discussed options- will try HA series as this has provided relief for her in the past. Orthovisc #1 RT knee done today- flaquita well. F/up 1 week to cont series 3/18/25:  Orthovisc #2 RT knee done today- flaquita well. F/up 1 week to cont series 3/24/25: Orthovisc #3 RT knee done today- flaquita well. F/up 1 week to cont series  3/31/25: Inj tolerated well.  She will return in 4 weeks to discuss possiblly booking TKA.

## 2025-05-19 NOTE — ASSESSMENT
[FreeTextEntry1] : Previous doc: she has adv OA in PF and mild in the med compartment with some spurring - we will start with PT she is loosing wt and did discuss HA inj in the future - some mild Hand OA 8/7/18: Less pain since starting PT - will continue this for now. 8/27/18: Acute worsening right knee pain - asp/inj today and reeval in 1 month. Will get auth for orthovisc. 10/16/18: Inj tolerated well - asp 30cc. 10/23/18: Inj tolerated well. Asp 30cc. 10/30/18: Inj tolerated well - asp 30cc. 11/6/18: Inj tolerated well - asp 30cc. 12/4/18: No sig relief from HA or cortisone - MRI right knee eval for MMT. Has OA but mostly PF compartment and her pain is medial. 1/8/19: MRI showing adv medial and PF OA. Too much damage for arthroscopy. Discussed right TKA when she is ready. - she is planning for TKA in June when the kids are off as she takes care of her grandkids 12/5/22: Advanced OA b/l knees. She is not a arthroscope candidate at this time due to severe OA. Due to acute inflammation on the left knee, recommend CSI/ASP and with chronic OA on the right knee, will start HA injections. Discussed need for TKA in the future but at this point, she would like to hold off. If left knee continues to have pain, I recommend starting HA injections next week as she has had good results in the past. Start PT for early ROM of left knee. 12/12/22: Inj tolerated well. 12/19/22: Inj tolerated well. 1/9/23: Inj tolerated well.  Cont left knee pain, start orthovisc left knee today as well. 3/6/23: Had 1st inj 2 months ago but unable to return since due to death in the family - resume series, inj tolerated well.  Right knee is better after orthovisc. 3/20/23: Inj tolerated well. 4/24/23: Inj tolerated well. 5/6/24: Adv OA b/l knees (R>L). XR showing some progression since 2022. Still has decent motion and function- will hold off on surgery and continue to proceed conservatively. She is well informed and would like to proceed with b/l knee csi and a course of PT. Follow up 6 weeks.  1/6/25: Worsening right knee pain recently, will try cortisone inj again - tolerated well.  Had good relief last time from this.  Left knee tolerable for now, will hold off on inj. 3/3/25: Pt with adv b/l knee OA (R>L symptomatically). Sig relief with RT knee csi at last visit but only for about 3 weeks. XR show no sig progression but RT knee symptoms have gotten significantly worse. Discussed options- will try HA series as this has provided relief for her in the past. Orthovisc #1 RT knee done today- flaquita well. F/up 1 week to cont series 3/18/25:  Orthovisc #2 RT knee done today- flaquita well. F/up 1 week to cont series 3/24/25: Orthovisc #3 RT knee done today- flaquita well. F/up 1 week to cont series 3/31/25: Inj tolerated well.  She will return in 4 weeks to discuss possiblly booking TKA.  5/19/25: Cont pain, discussed options and will plan for right TKA.  CT right knee eval bone loss.  Has h/o nehemias ornelas and will need cardiac clearance.  Asp/inj today for acute relief and will plan for surgery in september.

## 2025-05-19 NOTE — DISCUSSION/SUMMARY
[de-identified] : The natural progression of Osteoarthritis was explained to the patient. We discussed the possible treatment options from conservative to operative. These included NSAIDS, Glucosamine and Chondroitin sulfate, and Physical Therapy as well different types of injections. We also discussed that at some point they may progress to needing a TKA.  Information and pamphlets were given when appropriate.   Patient Complains of pain in Knee with a level that often reaches greater than an 8/10. The Pain has been progressively worsening of his/her treatment course. The pain has interfered with their ADLs and worsens with weight bearing. On exam they often have episodes of swelling/effusion with limited ROM. Pain worsens with ROM passive and active and I can palpate crepitus.   X-rays were reviewed with the patient, and they show joint space narrowing, subchondral sclerosis, osteophyte formation, and subchondral cysts.   After a period of more than 12 weeks physical therapy or exercise program done with me or another treating physician, they have continued pain. The patient has failed a trial of NSAID medication or pain relievers if they were unable to tolerate NSAID medications as well as a series of injection, steroid or Hyaluronic Acid. After a long discussion with the patient both the patient and I have decided we have exhausted all forms of less radical treatments, and they would like to proceed with Total Knee Replacement   We discussed my findings and the natural history of their condition. We talked about the details of the proposed surgery and the recovery. We discussed the material risks, possible benefits and alternatives to surgery. The risks include but are not limited to infection, bleeding and possible need for blood transfusion, fracture, bowel blockage, bladder retention or infection, need for reoperation, stiffness and/or limited range of motion, possible damage to nerves and blood vessels, failure of fixation of components, risk of deep vein thromboses and pulmonary embolism, wound healing problems, dislocation, and possible leg length discrepancy. Although incredibly rare, we also discussed the risks of a cardiac event, stroke and even death during, or following, the surgery. We discussed the type of implants the patient will be receiving and the type of fixation that will be used, as well as whether a robot or computer navigation aide will be used. The patient understands they will need medical clearance and will attend a preoperative joint education class. We also discussed the type of anesthesia they will receive, and the risks associated with hospital or rehab length of stay, obesity, diabetes and smoking.

## 2025-05-19 NOTE — IMAGING
[de-identified] : RIGHT KNEE ROM 5-115, some guarding medial joint line tenderness  lateral joint line tenderness crepitus no sig effusion +2 pt pulses 5/5 no edema NVI  LEFT KNEE no pain with valgus stress 5-100 medial joint line tenderness crepitus 5/5 NVI  XR B/L KNEES (3/3/25) showing adv varus OA, sig PF OA on the left- KL 4, no sig progression from May 2024

## 2025-05-19 NOTE — HISTORY OF PRESENT ILLNESS
[4] : 4 [Orthovisc] : Orthovisc [9] : 9 [5] : 5 [Dull/Aching] : dull/aching [Constant] : constant [de-identified] : 5/19/25: RT knee follow up, min relief from HA injections.  Previous doc: 4/24/18: Right > left knee pain and swelling for several years. Aleve and ice helps. Pain is not everyday. Also left hand pain occasionally - right hand dominant. Family history of RA. 8/7/18: Feeling better with PT. 8/27/18: Worsening pain after last visit, difficulty walking. 10/16/18: Orthovisc #1 right knee. 10/23/18: Orthovisc #2 right knee. 10/30/18: Orthovisc #3 right knee. Improving. 11/6/18: Orthovisc #4 right knee. 12/4/18: Mild relief from HA injections but still has pain. 1/8/19: F/U MRI right knee. Continued pain - had a few days where it was better but overall still trouble sleeping and pain with activity. 3/5 she is overall doing better and has better ROM wth PT  Pain at night some nights not pain min pain during the day finding lately more days with less pain  12/5/22: Patient was moving boxes 11/22/22 and turned and felt an acute pop in her left knee that is significantly painful. She has been wearing a BLANCO since then. Pain has started to subside slowly. Also having increasing pain in the right knee with hx of OA. She was contemplating TKA in the past but with HA injections and PT she has gotten better.  12/12/22: Orthovisc #2 right knee. 12/19/22: Orthovisc #3 right knee. 1/9/23: Orthovisc #4 right knee, improving.  Concerned about left knee. 3/6/23: Mild improvement with right knee pain s/p completing series of orthovisc injections. Started orthovisc for the left knee at last visit, has not been able to come back due to a death in the family. Reports continued pain and stiffness over both knees.  3/20/23: Orthovisc #3 left knee. 4/24/23: Orthovisc #4 left knee.  Has not been able to f/u sooner due to travel for family. 5/6/24: Adv OA b/l knees- here today for the RT knee. Interested in beginning PT. Difficulty with extension. Taking aleeve as needed with good relief. Ambulates without assistance. 1/6/25: Worsening knee pain recently.  Did well with cortisone last time. 3/3/25: Pt with adv b/l knee OA (R>L). Sig relief with csi inj in the RT knee in January for 3 weeks but now pain has returned and worsening. Feels knee locking up. LT knee has been stable. Ambulates without assistance.  3/18/25: Orthovisc #2 right knee. 3/24/25: Orthovisc #3 right knee.  3/31/25: Orthovisc #4 right knee.  Thinking about surgery.  [] : no [FreeTextEntry1] : right knee [de-identified] : Pt is attending PT, report she has good and bad days, pain is fairly persistent. finished orthovisc injection with minimal relief